# Patient Record
(demographics unavailable — no encounter records)

---

## 2025-01-10 NOTE — PLAN
[FreeTextEntry1] : Routine care   Patient screened for depression. No signs of clinical depression. PHQ-9 scores reviewed over the course of the visit and 5-10 minutes of face to face time spent. Follow up with changes in mood including other symptoms of anxiety.

## 2025-01-10 NOTE — HISTORY OF PRESENT ILLNESS
[FreeTextEntry1] : no issues  Bone density with improvement  [Patient reported mammogram was normal] : Patient reported mammogram was normal [Patient reported PAP Smear was normal] : Patient reported PAP Smear was normal [Mammogramdate] : 2024 [PapSmeardate] : 2023 [BoneDensityDate] : 2024 [TextBox_37] : osteopenia  [ColonoscopyDate] : UTD

## 2025-02-20 NOTE — REASON FOR VISIT
[Initial Evaluation] : an initial evaluation for [FreeTextEntry2] : sinus issues and nasal congestion

## 2025-02-20 NOTE — PHYSICAL EXAM
[Nasal Endoscopy Performed] : nasal endoscopy was performed, see procedure section for findings [] : septum deviated bilaterally [Midline] : trachea located in midline position [Normal] : no rashes [de-identified] : The bilateral inferior nasal turbinates are severely hypertrophic and edematous at baseline, causing almost total obstruction to the nasal cavity

## 2025-02-20 NOTE — ASSESSMENT
[FreeTextEntry1] : Patient 62-year-old female incredibly sensitive regarding to anything in her nose long history of nasal congestion stayed away because of her fear of getting examined.  On examination she allowed us to decongested she allowed us to do a nasal endoscopy that revealed significantly deviated septum but no evidence of any tumors masses or polyps we sent her for a CAT scan to definitively evaluate her sinuses we also did some allergy testing to see if she has any underlying allergies and we will reassess her after we get a CAT scan.

## 2025-02-20 NOTE — CONSULT LETTER
[Dear  ___] : Dear  [unfilled], [Consult Letter:] : I had the pleasure of evaluating your patient, [unfilled]. [Please see my note below.] : Please see my note below. [Consult Closing:] : Thank you very much for allowing me to participate in the care of this patient.  If you have any questions, please do not hesitate to contact me. [Sincerely,] : Sincerely, [FreeTextEntry3] : Arjun Teran MD [facility] Associated Professor, [employer]

## 2025-02-20 NOTE — END OF VISIT
[FreeTextEntry3] : I, Dr. Teran personally performed the evaluation and management (E/M) services including all necessary procedures, for this new patient. That E/M includes conducting the clinically appropriate initial history &/or exam, assessing all conditions, and establishing the plan of care. Today, my DEMETRI, Rose Pfeiffer, was here to observe &/or participate in the visit & follow plan of care established by me.

## 2025-02-20 NOTE — HISTORY OF PRESENT ILLNESS
[de-identified] : Patient comes in with heavy nasal congestion for years, worse over the last 9 months. She has had a long history of not being able to breathe through the nose. She has used multiple nasal sprays with no benefit. She will randomly use the AFrin for a few days as she cannot breathe and its the only relief, but knows she cannot use it for more than a couple days at a time.  She has sever anxiety about anyone looking into her nose and avoid the doctor so although this has been progressively worsening for at least 9 months she has not had anyone look.